# Patient Record
(demographics unavailable — no encounter records)

---

## 2024-11-08 NOTE — HISTORY OF PRESENT ILLNESS
[FreeTextEntry1] : 12 y.o female was walking dog and tripped over leash and fell on a rock this past summer. mom cleaned out wound and applied antibiotic ointment. But then scar started to get enlarged and itchy. no personal or family h/o keloids.  PSH: appendectomy, laparoscopically 10/24 Mom also noted area of swelling at umbilicus

## 2025-04-17 NOTE — PROCEDURE
[FreeTextEntry1] : labial frenulum  [FreeTextEntry2] : incision of labial frenulum, frenulectomy [FreeTextEntry3] : lido w/ epi [FreeTextEntry4] : min [FreeTextEntry5] : none [FreeTextEntry6] : IC obtained. lido w/ epi injected.  15 blade used to incise labial frenulum. silver nitrate applied